# Patient Record
Sex: FEMALE | Race: BLACK OR AFRICAN AMERICAN | NOT HISPANIC OR LATINO | URBAN - METROPOLITAN AREA
[De-identification: names, ages, dates, MRNs, and addresses within clinical notes are randomized per-mention and may not be internally consistent; named-entity substitution may affect disease eponyms.]

---

## 2021-10-09 ENCOUNTER — EMERGENCY (EMERGENCY)
Facility: HOSPITAL | Age: 36
LOS: 0 days | Discharge: HOME | End: 2021-10-09
Attending: EMERGENCY MEDICINE | Admitting: EMERGENCY MEDICINE
Payer: MEDICAID

## 2021-10-09 VITALS
SYSTOLIC BLOOD PRESSURE: 107 MMHG | HEART RATE: 86 BPM | RESPIRATION RATE: 18 BRPM | DIASTOLIC BLOOD PRESSURE: 68 MMHG | TEMPERATURE: 98 F | OXYGEN SATURATION: 100 % | WEIGHT: 175.05 LBS | HEIGHT: 69 IN

## 2021-10-09 DIAGNOSIS — I10 ESSENTIAL (PRIMARY) HYPERTENSION: ICD-10-CM

## 2021-10-09 DIAGNOSIS — R51.9 HEADACHE, UNSPECIFIED: ICD-10-CM

## 2021-10-09 DIAGNOSIS — U07.1 COVID-19: ICD-10-CM

## 2021-10-09 PROCEDURE — 99283 EMERGENCY DEPT VISIT LOW MDM: CPT

## 2021-10-09 RX ORDER — ACETAMINOPHEN 500 MG
650 TABLET ORAL ONCE
Refills: 0 | Status: COMPLETED | OUTPATIENT
Start: 2021-10-09 | End: 2021-10-09

## 2021-10-09 RX ADMIN — Medication 650 MILLIGRAM(S): at 16:02

## 2021-10-09 NOTE — ED PROVIDER NOTE - NS ED ROS FT
Eyes:  No visual changes, eye pain or discharge.  ENMT:  No hearing changes, pain, discharge or infections. No neck pain or stiffness.  Cardiac:  No chest pain, SOB or edema. No chest pain with exertion.  Respiratory:  No cough or respiratory distress. No hemoptysis.  GI:  No nausea, vomiting, diarrhea or abdominal pain.  :  No dysuria, frequency or burning.  MS:  No myalgia, muscle weakness, joint pain or back pain.  Neuro: + headache.   No LOC.  Skin:  No skin rash.   Endocrine: No history of thyroid disease or diabetes.

## 2021-10-09 NOTE — ED PROVIDER NOTE - PATIENT PORTAL LINK FT
You can access the FollowMyHealth Patient Portal offered by [facility] by registering at the following website: [email or web address]. By joining [employer]’s FollowMyHealth portal, you will also be able to view your health information using other applications (apps) compatible with our system.

## 2021-10-09 NOTE — ED PROVIDER NOTE - OBJECTIVE STATEMENT
The patient is a 36 year old female with a history of HTN, covid +9/28 presenting for headache since yesterday. Headache is bitemporal, throbbing, not worse headache of life. No fevers/chills, nausea, vomiting, chest pain, sob, abdominal pain. Thought headache was related to BP so came to ED.

## 2021-10-09 NOTE — ED PROVIDER NOTE - PROGRESS NOTE DETAILS
ATTENDING NOTE: I personally evaluated the patient. I reviewed the Resident’s note (as assigned above), and agree with the findings and plan except as documented in my note. 37 y/o F with PMH of HTN, not vaccinated against COVID, tested (+) for COVID on 9/28 p/w HA starting yesterday. Pt thought BANKS was related to BP so came to ED. HA described as gradual, not worst HA of her life. No CP or n/v.   Const: Well nourished, well developed, appears stated age. Eyes: PERRL, no conjunctival injection. HENT:  Neck supple without meningismus. CV: RRR, Warm, well-perfused extremities. RESP: CTA B/L, no tachypnea. GI: soft, non-tender, non-distended. MSK: No gross deformities appreciated. Skin: Warm, dry. No rashes. Neuro: Alert, CNs II-XII grossly intact. Sensation and motor function of extremities grossly intact. Psych: Appropriate mood and affect.  Plan for Upreg and Tylenol

## 2021-10-09 NOTE — ED PROVIDER NOTE - ATTENDING CONTRIBUTION TO CARE
ATTENDING NOTE: I personally evaluated the patient. I reviewed the Resident’s note (as assigned above), and agree with the findings and plan except as documented in my note.     35 y/o F with PMH of HTN, not vaccinated against COVID, tested (+) for COVID on 9/28 p/w HA starting yesterday. Pt thought BANKS was related to BP so came to ED. HA described as gradual, not worst HA of her life. No CP or n/v.     Const: Well nourished, well developed, appears stated age. Eyes: PERRL, no conjunctival injection. HENT:  Neck supple without meningismus. CV: RRR, Warm, well-perfused extremities. RESP: CTA B/L, no tachypnea. GI: soft, non-tender, non-distended. MSK: No gross deformities appreciated. Skin: Warm, dry. No rashes. Neuro: Alert, CNs II-XII grossly intact. Sensation and motor function of extremities grossly intact. Psych: Appropriate mood and affect.    Plan for Upreg and Tylenol

## 2021-10-09 NOTE — ED PROVIDER NOTE - NSFOLLOWUPCLINICS_GEN_ALL_ED_FT
details
[facility]  [address] [place] [address]  Phone: [phone]  Fax:   Follow Up Time: 1-3 Days    [facility] Medicine [facility]  Medicine  [address] [place] [address]   Phone: [phone]  Fax:   Follow Up Time: 1-3 Days

## 2021-11-14 ENCOUNTER — EMERGENCY (EMERGENCY)
Facility: HOSPITAL | Age: 36
LOS: 0 days | Discharge: LEFT BEFORE DISCHARGE | End: 2021-11-14
Attending: STUDENT IN AN ORGANIZED HEALTH CARE EDUCATION/TRAINING PROGRAM | Admitting: STUDENT IN AN ORGANIZED HEALTH CARE EDUCATION/TRAINING PROGRAM
Payer: MEDICAID

## 2021-11-14 VITALS
HEIGHT: 69 IN | SYSTOLIC BLOOD PRESSURE: 125 MMHG | HEART RATE: 82 BPM | DIASTOLIC BLOOD PRESSURE: 81 MMHG | RESPIRATION RATE: 17 BRPM | WEIGHT: 169.98 LBS | OXYGEN SATURATION: 99 % | TEMPERATURE: 98 F

## 2021-11-14 DIAGNOSIS — R21 RASH AND OTHER NONSPECIFIC SKIN ERUPTION: ICD-10-CM

## 2021-11-14 DIAGNOSIS — N64.4 MASTODYNIA: ICD-10-CM

## 2021-11-14 DIAGNOSIS — I10 ESSENTIAL (PRIMARY) HYPERTENSION: ICD-10-CM

## 2021-11-14 DIAGNOSIS — Z87.2 PERSONAL HISTORY OF DISEASES OF THE SKIN AND SUBCUTANEOUS TISSUE: ICD-10-CM

## 2021-11-14 DIAGNOSIS — N64.59 OTHER SIGNS AND SYMPTOMS IN BREAST: ICD-10-CM

## 2021-11-14 LAB
ALBUMIN SERPL ELPH-MCNC: 4.3 G/DL — SIGNIFICANT CHANGE UP (ref 3.5–5.2)
ALP SERPL-CCNC: 55 U/L — SIGNIFICANT CHANGE UP (ref 30–115)
ALT FLD-CCNC: 19 U/L — SIGNIFICANT CHANGE UP (ref 0–41)
ANION GAP SERPL CALC-SCNC: 19 MMOL/L — HIGH (ref 7–14)
AST SERPL-CCNC: 20 U/L — SIGNIFICANT CHANGE UP (ref 0–41)
BILIRUB SERPL-MCNC: 0.4 MG/DL — SIGNIFICANT CHANGE UP (ref 0.2–1.2)
BUN SERPL-MCNC: 9 MG/DL — LOW (ref 10–20)
CALCIUM SERPL-MCNC: 9.3 MG/DL — SIGNIFICANT CHANGE UP (ref 8.5–10.1)
CHLORIDE SERPL-SCNC: 101 MMOL/L — SIGNIFICANT CHANGE UP (ref 98–110)
CO2 SERPL-SCNC: 18 MMOL/L — SIGNIFICANT CHANGE UP (ref 17–32)
CREAT SERPL-MCNC: 0.8 MG/DL — SIGNIFICANT CHANGE UP (ref 0.7–1.5)
GLUCOSE SERPL-MCNC: 90 MG/DL — SIGNIFICANT CHANGE UP (ref 70–99)
HCG SERPL QL: NEGATIVE — SIGNIFICANT CHANGE UP
HCT VFR BLD CALC: 38.8 % — SIGNIFICANT CHANGE UP (ref 37–47)
HGB BLD-MCNC: 12.9 G/DL — SIGNIFICANT CHANGE UP (ref 12–16)
MCHC RBC-ENTMCNC: 31.5 PG — HIGH (ref 27–31)
MCHC RBC-ENTMCNC: 33.2 G/DL — SIGNIFICANT CHANGE UP (ref 32–37)
MCV RBC AUTO: 94.6 FL — SIGNIFICANT CHANGE UP (ref 81–99)
NRBC # BLD: 0 /100 WBCS — SIGNIFICANT CHANGE UP (ref 0–0)
PLATELET # BLD AUTO: 359 K/UL — SIGNIFICANT CHANGE UP (ref 130–400)
POTASSIUM SERPL-MCNC: 3.9 MMOL/L — SIGNIFICANT CHANGE UP (ref 3.5–5)
POTASSIUM SERPL-SCNC: 3.9 MMOL/L — SIGNIFICANT CHANGE UP (ref 3.5–5)
PROT SERPL-MCNC: 6.9 G/DL — SIGNIFICANT CHANGE UP (ref 6–8)
RBC # BLD: 4.1 M/UL — LOW (ref 4.2–5.4)
RBC # FLD: 14.8 % — HIGH (ref 11.5–14.5)
SODIUM SERPL-SCNC: 138 MMOL/L — SIGNIFICANT CHANGE UP (ref 135–146)
WBC # BLD: 10.02 K/UL — SIGNIFICANT CHANGE UP (ref 4.8–10.8)
WBC # FLD AUTO: 10.02 K/UL — SIGNIFICANT CHANGE UP (ref 4.8–10.8)

## 2021-11-14 PROCEDURE — 99284 EMERGENCY DEPT VISIT MOD MDM: CPT

## 2021-11-14 RX ORDER — IBUPROFEN 200 MG
800 TABLET ORAL ONCE
Refills: 0 | Status: COMPLETED | OUTPATIENT
Start: 2021-11-14 | End: 2021-11-14

## 2021-11-14 RX ADMIN — Medication 800 MILLIGRAM(S): at 14:03

## 2021-11-14 NOTE — ED PROVIDER NOTE - OBJECTIVE STATEMENT
36 y.o. F. pmh HTN here for R breast pain associated with skin color changes and swelling x 10 days. No relief with 5 days of clindamycin. Has hx of abscesses in axillae that were successfully drainage in past. Denies hx of previous breast involvement. Pain burning in quality constant and nonradiating.

## 2021-11-14 NOTE — CONSULT NOTE ADULT - SUBJECTIVE AND OBJECTIVE BOX
HPI: 35 yo F with a hx of asthma and HTN presents to the ED c/o right breast swelling and pain. Pt reports she had a similar lesion in her right armpit a few years ago which was excised. She has been on oral clindamycin from her PCP for 5 days with some improvement but pt noted worse burning in the area today so presented for evaluation. Denies fevers, chills, CP, SOB, nausea, vomiting, drainage from the area, or current breastfeeding.     ROS: 10-system review is otherwise negative except HPI above.      PAST MEDICAL & SURGICAL HISTORY: HTN, asthma    FAMILY HISTORY: non-contributory    SOCIAL HISTORY:  denies toxic habits    ALLERGIES: No Known Allergies    HOME MEDICATIONS: clindamycin    Vitals:   T(C): 36.7 (11-14-21 @ 12:57), Max: 36.7 (11-14-21 @ 12:57)  HR: 82 (11-14-21 @ 12:57) (82 - 82)  BP: 125/81 (11-14-21 @ 12:57) (125/81 - 125/81)  RR: 17 (11-14-21 @ 12:57) (17 - 17)  SpO2: 99% (11-14-21 @ 12:57) (99% - 99%)  Height (cm): 175.3 (11-14 @ 12:57)  Weight (kg): 77.1 (11-14 @ 12:57)  BMI (kg/m2): 25.1 (11-14 @ 12:57)  BSA (m2): 1.93 (11-14 @ 12:57)    PHYSICAL EXAM:   General: NAD  Neuro: AAOx3  HEENT: NC, AT, sclera anicteric  Cardio: RRR,   Resp: Good effort  Thorax: No chest wall tenderness  Breast: in the inframammary region of the right breast there is an approximately 1 cm ovoid area of swelling and induration. mildly tender to palpation. no fluctuance. no drainage. bedside ultrasound done demonstrating a 1 cm cystic structure without enhancement on doppler. no debris noted in the cyst  GI/Abd: Soft, NT/ND, no rebound/guarding.   Skin: Intact, no breakdown  Lymphatic/Nodes: No palpable lymphadenopathy  Musculoskeletal: All 4 extremities moving spontaneously, no limitations.   --------------------------------------------------------------------------------------------    LABS  CBC (11-14 @ 13:29)                              12.9                           10.02   )----------------(  359        --    % Neutrophils, --    % Lymphocytes, ANC: --                                  38.8      BMP (11-14 @ 13:29)             138     |  101     |  9<L>  		Ca++ --      Ca 9.3                ---------------------------------( 90    		Mg --                 3.9     |  18      |  0.8   			Ph --        LFTs (11-14 @ 13:29)      TPro 6.9 / Alb 4.3 / TBili 0.4 / DBili -- / AST 20 / ALT 19 / AlkPhos 55    IMAGING: breast ultrasound pending    ASSESSMENT: Patient is a 36y old f with right breast lesion.     PLAN:   - concern for hidradenitis given location and hx of prior lesion in right axilla. Therefore surgical exploration not ideal in this setting as it may exacerbate the problem. Pt does note improvement since starting antibiotics.  - would obtain official breast ultrasound.   - pending imaging, likely recommend d/c home on Doxycycline with outpatient follow up with breast surgeon Dr. Risa Enrique  - d/w Dr. Enrique

## 2021-11-14 NOTE — ED PROVIDER NOTE - PROGRESS NOTE DETAILS
dc: surgery consult placed for induration, erythema of Rbreast and failed outpt po abx management. Will evaluate pt in ER. Plan for labs and breast US.

## 2021-11-14 NOTE — ED PROVIDER NOTE - NS ED ROS FT
Constitutional: (-) fever (-) chills  (-) lightheadedness (-) weight loss  Musculoskeletal: (-) neck pain, (-) back pain, (-) joint pain (-) joint swelling (-) painful ROM  Integumentary: (+) rash, (-) edema (-) lacerations (-) pruritis (+) pain + swelling (-) drainage

## 2021-11-14 NOTE — ED PROVIDER NOTE - CLINICAL SUMMARY MEDICAL DECISION MAKING FREE TEXT BOX
36 yr old f that presents with breast pain  -labs  -imaging  -surgery aware  -pt eloped during evaluation

## 2021-11-14 NOTE — ED PROVIDER NOTE - ATTENDING CONTRIBUTION TO CARE
36 yr old f with a pmh significant for htn who presents with R breast pain. Pt states that for the past week she has been having swelling in the R breast. Pt went to PMD, whom prescribed her clindamycin, pt has completed 5 days of antibiotics and states that it is getting worse. Pt denies any fevers, chills, nausea, vomiting, abd pain or any other medical complaints.     VITAL SIGNS: I have reviewed nursing notes and confirm.  CONSTITUTIONAL: non-toxic, well appearing  SKIN: no rash, no petechiae.  EYES: EOMI, pink conjunctiva, anicteric  ENT: tongue midline, no exudates, MMM  NECK: Supple; no meningismus, no JVD  CARD: RRR, no murmurs, equal radial pulses bilaterally 2+  BREAST: there is a 3-4 cm area of induration below the R breast, skin changes, no discharge, mild ttp  RESP: CTAB, no respiratory distress  ABD: Soft, non-tender, non-distended, no peritoneal signs, no HSM, no CVA tenderness  EXT: Normal ROM x4. No edema. No calves tenderness  NEURO: Alert, oriented. CN2-12 intact, equal strength bilaterally, nl gait.  PSYCH: Cooperative, appropriate.    a/p  36 yr old f that presents with breast pain  -labs  -imaging  -surgery aware  -reassess  -dispo pending

## 2021-11-14 NOTE — ED PROVIDER NOTE - PHYSICAL EXAMINATION
Physical Exam    Vital Signs: I have reviewed the initial vital signs.  Constitutional: well-nourished, appears stated age, no acute distress  Eyes: Conjunctiva pink, Sclera clear  Respiratory: unlabored respiratory effort, speaking in full sentences  Musculoskeletal: supple neck, no lower extremity edema,   Integumentary: warm, dry,  no lacerations, + 3 cm area of induration darkening of skin, erythema with TTP. No nipple involvment, no drainage or purulence. No palpable lymph nodes in axilla.